# Patient Record
Sex: FEMALE | Race: WHITE
[De-identification: names, ages, dates, MRNs, and addresses within clinical notes are randomized per-mention and may not be internally consistent; named-entity substitution may affect disease eponyms.]

---
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
## 2020-01-07 ENCOUNTER — APPOINTMENT (OUTPATIENT)
Dept: PEDIATRICS | Facility: CLINIC | Age: 20
End: 2020-01-07

## 2020-01-15 ENCOUNTER — RESULT REVIEW (OUTPATIENT)
Age: 20
End: 2020-01-15

## 2020-07-16 ENCOUNTER — RESULT REVIEW (OUTPATIENT)
Age: 20
End: 2020-07-16

## 2021-01-18 ENCOUNTER — RESULT REVIEW (OUTPATIENT)
Age: 21
End: 2021-01-18

## 2022-02-28 ENCOUNTER — RESULT REVIEW (OUTPATIENT)
Age: 22
End: 2022-02-28